# Patient Record
Sex: MALE | Race: ASIAN | NOT HISPANIC OR LATINO
[De-identification: names, ages, dates, MRNs, and addresses within clinical notes are randomized per-mention and may not be internally consistent; named-entity substitution may affect disease eponyms.]

---

## 2019-09-17 ENCOUNTER — APPOINTMENT (OUTPATIENT)
Dept: COLORECTAL SURGERY | Facility: CLINIC | Age: 34
End: 2019-09-17
Payer: MEDICAID

## 2019-09-17 VITALS
SYSTOLIC BLOOD PRESSURE: 108 MMHG | WEIGHT: 180 LBS | HEIGHT: 72 IN | HEART RATE: 79 BPM | BODY MASS INDEX: 24.38 KG/M2 | DIASTOLIC BLOOD PRESSURE: 75 MMHG | RESPIRATION RATE: 14 BRPM

## 2019-09-17 DIAGNOSIS — Z78.9 OTHER SPECIFIED HEALTH STATUS: ICD-10-CM

## 2019-09-17 PROBLEM — Z00.00 ENCOUNTER FOR PREVENTIVE HEALTH EXAMINATION: Status: ACTIVE | Noted: 2019-09-17

## 2019-09-17 PROCEDURE — 46940 TREATMENT OF ANAL FISSURE: CPT

## 2019-09-17 PROCEDURE — 99204 OFFICE O/P NEW MOD 45 MIN: CPT | Mod: 25

## 2019-09-17 RX ORDER — NITROGLYCERIN 4 MG/G
0.4 OINTMENT RECTAL
Qty: 1 | Refills: 2 | Status: ACTIVE | COMMUNITY
Start: 2019-09-17 | End: 1900-01-01

## 2019-09-27 PROBLEM — Z78.9 NON-SMOKER: Status: ACTIVE | Noted: 2019-09-27

## 2019-09-27 NOTE — ASSESSMENT
[FreeTextEntry1] : 34-year-old with anal fissure and hemorrhoids. Recommend nitroglycerin ointmed,recommend high fiber diet, metamucil daily, stool softners, sitzs baths, hydrocortisone cream and suppositories prn

## 2019-09-27 NOTE — PHYSICAL EXAM
[Abdomen Masses] : No abdominal masses [Abdomen Tenderness] : ~T No ~M abdominal tenderness [Tender] : nontender [Normal rectal exam] : exam was normal [Excoriation] : no perianal excoriation [Tender, Swollen] : tender, swollen [Tight] : was tight [Posterior] : posteriorly [None] : there was no rectal mass  [JVD] : no jugular venous distention  [Normal Breath Sounds] : Normal breath sounds [Normal Heart Sounds] : normal heart sounds [Normal Rate and Rhythm] : normal rate and rhythm [No Rash or Lesion] : No rash or lesion [Oriented to Person] : oriented to person [Oriented to Place] : oriented to place [Oriented to Time] : oriented to time [Calm] : calm [de-identified] : Looks well in no distress, of stated age. [de-identified] : pupils equal reactive to light normocephalic atraumatic. [de-identified] : moves all 4 extremities appropriately with 5 over 5 strength

## 2019-09-27 NOTE — HISTORY OF PRESENT ILLNESS
[FreeTextEntry1] : 34-year-old male with complaints of anal pain and bleeding symptoms at present for several months and worsening. Bleeding is bright red bowel movements pain is 9/10 at times

## 2019-10-15 ENCOUNTER — APPOINTMENT (OUTPATIENT)
Dept: COLORECTAL SURGERY | Facility: CLINIC | Age: 34
End: 2019-10-15
Payer: MEDICAID

## 2019-10-15 VITALS
HEIGHT: 72 IN | DIASTOLIC BLOOD PRESSURE: 70 MMHG | WEIGHT: 180 LBS | SYSTOLIC BLOOD PRESSURE: 110 MMHG | BODY MASS INDEX: 24.38 KG/M2 | HEART RATE: 86 BPM | TEMPERATURE: 86 F

## 2019-10-15 DIAGNOSIS — R19.8 OTHER SPECIFIED SYMPTOMS AND SIGNS INVOLVING THE DIGESTIVE SYSTEM AND ABDOMEN: ICD-10-CM

## 2019-10-15 DIAGNOSIS — K60.2 ANAL FISSURE, UNSPECIFIED: ICD-10-CM

## 2019-10-15 DIAGNOSIS — Z87.19 PERSONAL HISTORY OF OTHER DISEASES OF THE DIGESTIVE SYSTEM: ICD-10-CM

## 2019-10-15 DIAGNOSIS — R19.4 CHANGE IN BOWEL HABIT: ICD-10-CM

## 2019-10-15 DIAGNOSIS — K62.89 OTHER SPECIFIED DISEASES OF ANUS AND RECTUM: ICD-10-CM

## 2019-10-15 DIAGNOSIS — Z87.898 PERSONAL HISTORY OF OTHER SPECIFIED CONDITIONS: ICD-10-CM

## 2019-10-15 PROCEDURE — 99215 OFFICE O/P EST HI 40 MIN: CPT

## 2019-10-16 PROBLEM — Z87.898 HISTORY OF DIARRHEA: Status: RESOLVED | Noted: 2019-10-14 | Resolved: 2019-10-16

## 2019-10-16 PROBLEM — Z87.19 HISTORY OF CONSTIPATION: Status: RESOLVED | Noted: 2019-10-14 | Resolved: 2019-10-16

## 2019-10-16 PROBLEM — K62.89 RECTAL DISCOMFORT: Status: RESOLVED | Noted: 2019-10-14 | Resolved: 2019-10-16

## 2019-10-16 PROBLEM — Z87.19 HISTORY OF INCONTINENCE OF FECES: Status: RESOLVED | Noted: 2019-10-14 | Resolved: 2019-10-16

## 2019-10-16 PROBLEM — K62.89 RECTAL PAIN: Status: RESOLVED | Noted: 2019-10-14 | Resolved: 2019-10-16

## 2019-10-16 PROBLEM — R19.4 CHANGE IN BOWEL HABITS: Status: RESOLVED | Noted: 2019-10-14 | Resolved: 2019-10-16

## 2019-10-16 PROBLEM — R19.8 PAIN WITH BOWEL MOVEMENTS: Status: RESOLVED | Noted: 2019-10-14 | Resolved: 2019-10-16

## 2019-10-16 PROBLEM — K60.2 ANAL FISSURE: Status: ACTIVE | Noted: 2019-09-17

## 2019-10-16 NOTE — PHYSICAL EXAM
[Abdomen Masses] : No abdominal masses [Abdomen Tenderness] : ~T No ~M abdominal tenderness [Tender] : nontender [Normal rectal exam] : exam was normal [Excoriation] : no perianal excoriation [Tender, Swollen] : tender, swollen [Tight] : was tight [Posterior] : posteriorly [None] : there was no rectal mass  [JVD] : no jugular venous distention  [Normal Breath Sounds] : Normal breath sounds [Normal Heart Sounds] : normal heart sounds [Normal Rate and Rhythm] : normal rate and rhythm [No Rash or Lesion] : No rash or lesion [Oriented to Person] : oriented to person [Oriented to Place] : oriented to place [Oriented to Time] : oriented to time [Calm] : calm [de-identified] : Looks well in no distress, of stated age. [de-identified] : pupils equal reactive to light normocephalic atraumatic. [de-identified] : moves all 4 extremities appropriately with 5 over 5 strength

## 2019-10-16 NOTE — ASSESSMENT
[FreeTextEntry1] : 34-year-old with anal fissure and hemorrhoids. has not improved with medical treatment. patient wishes to proceed with surgery. Recommend EUA, sphincterotomy fissurectomy hemorroidectomy high fiber diet, metamucil daily, stool softners, sitzs baths, hydrocortisone cream and suppositories prn

## 2019-10-16 NOTE — HISTORY OF PRESENT ILLNESS
[FreeTextEntry1] : 34-year-old male with complaints of anal pain and bleeding symptoms at present for several months and worsening. Bleeding is bright red bowel movements pain is 9/10 at times, still has fissure and pain despite medical treatment, wishes to proceed with surgery

## 2021-02-28 ENCOUNTER — EMERGENCY (EMERGENCY)
Facility: HOSPITAL | Age: 36
LOS: 1 days | Discharge: HOME | End: 2021-02-28
Attending: EMERGENCY MEDICINE | Admitting: EMERGENCY MEDICINE
Payer: MEDICAID

## 2021-02-28 VITALS
SYSTOLIC BLOOD PRESSURE: 153 MMHG | DIASTOLIC BLOOD PRESSURE: 92 MMHG | OXYGEN SATURATION: 100 % | HEART RATE: 102 BPM | TEMPERATURE: 98 F | RESPIRATION RATE: 18 BRPM

## 2021-02-28 PROCEDURE — 99284 EMERGENCY DEPT VISIT MOD MDM: CPT | Mod: 25

## 2021-02-28 PROCEDURE — 93010 ELECTROCARDIOGRAM REPORT: CPT

## 2021-02-28 NOTE — ED ADULT TRIAGE NOTE - CHIEF COMPLAINT QUOTE
Pt c/o near syncope at 9pm today. States he had palpitations and "felt faint." States he also had it yesterday morning. No known PMH, states his father had cardiac health problems and passed away last year. Pt endorses feeling very anxious lately as well.

## 2021-03-01 LAB — GLUCOSE BLDC GLUCOMTR-MCNC: 103 MG/DL — HIGH (ref 70–99)

## 2021-03-01 NOTE — ED PROVIDER NOTE - PROGRESS NOTE DETAILS
FELICIA:  Patient declining labs, cxr at this time.  States he would like to go home to be with family and is worried that his mother with become upset if she knows he is in hospital.  I explained to patient that I would recommend labs, cxr and possible cdu stay for tte, although is low risk based on age and medical history.  At this time, patient elects to be discharged home and states he will call his pcp tomorrow to arrange for further testing and return to ED for worsening symptoms.  Patient was aaox3, not altered or intoxicated appearing, demonstrated steady gait.  Will d/c per patient request.

## 2021-03-01 NOTE — ED PROVIDER NOTE - CLINICAL SUMMARY MEDICAL DECISION MAKING FREE TEXT BOX
36 yo M no sig pmh presents with two episodes of near syncope.  VSS.  Exam reassuring, no focal neuro deficit, well perfused extremities, normal cardiac exam.  EKG snr with incomplete rbbb, narrow complex, no ischemic change.  No morphology c/w brugada, wpw, hocm, prolonged qt, arvh.  Low c/f acs, pe, dissection. 34 yo M no sig pmh presents with two episodes of near syncope.  VSS.  Exam reassuring, no focal neuro deficit, well perfused extremities, normal cardiac exam.  EKG snr with incomplete rbbb, narrow complex, no ischemic change.  No morphology c/w brugada, wpw, hocm, prolonged qt, arvh.  Low c/f acs, pe, dissection.  Will recommend labs, cxr, supportive care.  Dispo pending. 36 yo M no sig pmh presents with two episodes of near syncope.  VSS.  Exam reassuring, no focal neuro deficit, well perfused extremities, normal cardiac exam.  EKG snr with non specific intraventricular conduction delay, narrow complex, no axis deviation, no ischemic change.  No morphology c/w brugada, wpw, hocm, prolonged qt, arvh.  Low c/f acs, pe, dissection.  Will recommend labs, cxr, supportive care.  Dispo pending.

## 2021-03-01 NOTE — ED PROVIDER NOTE - PHYSICAL EXAMINATION
GEN:  Non-toxic appearing, non-distressed, speaking full sentences, non-diaphoretic, AAOx3  HEENT:  NCAT, neck supple, EOMI, PERRLA, sclera anicteric, no conjunctival pallor or injection, no stridor, normal voice, no tonsillar exudate, uvula midline, tympanic membranes and external auditory canals normal appearing bilaterally   CV:  regular rhythm and rate, s1/s2 audible, no murmurs, rubs or gallops, peripheral pulses 2+ and symmetric  PULM:  non-labored respirations, lungs clear to auscultation bilaterally, no wheezes, crackles or rales  ABD:  non distended, non-tender, no rebound, no guarding, negative Long's sign, bowel sounds normal, no cvat  MSK:  no gross deformity, non-tender extremities and joints, range of motion grossly normal appearing, no extremity edema, extremities warm and well perfused   NEURO:  AAOx3, CN II-XII intact, motor 5/5 in upper and lower extremities bilaterally, sensation grossly intact in extremities and trunk, finger to nose testing wnl, no nystagmus, negative Romberg, no pronator drift, no gait deficit  SKIN:  warm, dry, no rash or vesicles

## 2021-03-01 NOTE — ED PROVIDER NOTE - NSFOLLOWUPINSTRUCTIONS_ED_ALL_ED_FT
1.  Please return to care for worsening dizziness, palpitations, chest pain, nausea, vomiting, feeling like you will pass out.  This could be suggestive of a life threatening heart problem.    2.  Please follow up with your pcp as early as able to completed recommended testing in ED including labs and imaging of your heart and testing for blockages in the blood vessels of your heart.   3.  Please refrain from caffeine use until you see your pcp.

## 2021-03-01 NOTE — ED PROVIDER NOTE - OBJECTIVE STATEMENT
34 yo M no sig pmh presents with two episodes of feeling like he was going to pass out.  Patient states yesterday morning after waking had episode of acute onset lightheadedness and feeling like he was going to pass out.  Had similar episode tonight at 9 pm when he was eating dinner with family.  Patient denies syncope, was immediately responsive.  Episodes associated with palpitations.  Denies sx currently.  There was no associated cp, sob, nausea, vomiting.  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  Has been drinking caffeinated beverages more than usual.  Denies tobacco or illicit drug use.  Denies history of dvt/pe.  Father sustained sudden cardiac death at age 68, no family history of early cardiac death at young ages.

## 2021-03-01 NOTE — ED PROVIDER NOTE - PATIENT PORTAL LINK FT
You can access the FollowMyHealth Patient Portal offered by [facility] by registering at the following website: [email or web address]. By joining [facility]’s FollowMyHealth portal, you will also be able to view your health information using other applications (apps) compatible with our system.
